# Patient Record
Sex: FEMALE | Race: NATIVE HAWAIIAN OR OTHER PACIFIC ISLANDER
[De-identification: names, ages, dates, MRNs, and addresses within clinical notes are randomized per-mention and may not be internally consistent; named-entity substitution may affect disease eponyms.]

---

## 2021-10-20 ENCOUNTER — HOSPITAL ENCOUNTER (OUTPATIENT)
Dept: HOSPITAL 95 - LAB SHORT | Age: 21
Discharge: HOME | End: 2021-10-20
Attending: OBSTETRICS & GYNECOLOGY
Payer: COMMERCIAL

## 2021-10-20 DIAGNOSIS — Z34.81: Primary | ICD-10-CM

## 2022-03-23 ENCOUNTER — HOSPITAL ENCOUNTER (OUTPATIENT)
Dept: HOSPITAL 95 - LAB SHORT | Age: 22
Discharge: HOME | End: 2022-03-23
Attending: OBSTETRICS & GYNECOLOGY
Payer: COMMERCIAL

## 2022-03-23 DIAGNOSIS — Z34.83: Primary | ICD-10-CM

## 2022-03-23 DIAGNOSIS — Z3A.36: ICD-10-CM

## 2022-04-23 NOTE — NUR
04/23/22 0546 Addy Sauceda
DELIVERY OF VIABLE FEMALE INFANT. CORD SEGMENT GIVEN TO RT FOR GASES.
CORD BLOOD GIVEN TO FBP RN